# Patient Record
Sex: MALE | Race: WHITE | Employment: FULL TIME
[De-identification: names, ages, dates, MRNs, and addresses within clinical notes are randomized per-mention and may not be internally consistent; named-entity substitution may affect disease eponyms.]

---

## 2017-02-22 RX ORDER — SERTRALINE HYDROCHLORIDE 100 MG/1
100 TABLET, FILM COATED ORAL DAILY
Qty: 30 TABLET | Refills: 0 | Status: SHIPPED | OUTPATIENT
Start: 2017-02-22 | End: 2017-03-02 | Stop reason: SDUPTHER

## 2017-03-02 ENCOUNTER — OFFICE VISIT (OUTPATIENT)
Dept: FAMILY MEDICINE CLINIC | Facility: CLINIC | Age: 42
End: 2017-03-02

## 2017-03-02 VITALS — BODY MASS INDEX: 32.59 KG/M2 | SYSTOLIC BLOOD PRESSURE: 120 MMHG | DIASTOLIC BLOOD PRESSURE: 80 MMHG | WEIGHT: 247 LBS

## 2017-03-02 DIAGNOSIS — G47.09 OTHER INSOMNIA: ICD-10-CM

## 2017-03-02 DIAGNOSIS — F41.9 ANXIETY: Primary | ICD-10-CM

## 2017-03-02 PROCEDURE — 99213 OFFICE O/P EST LOW 20 MIN: CPT | Performed by: FAMILY MEDICINE

## 2017-03-02 RX ORDER — ZOLPIDEM TARTRATE 5 MG/1
5 TABLET ORAL NIGHTLY PRN
Qty: 30 TABLET | Refills: 5 | Status: SHIPPED | OUTPATIENT
Start: 2017-03-02 | End: 2017-04-01

## 2017-03-02 RX ORDER — SERTRALINE HYDROCHLORIDE 100 MG/1
100 TABLET, FILM COATED ORAL DAILY
Qty: 30 TABLET | Refills: 5 | Status: SHIPPED | OUTPATIENT
Start: 2017-03-02 | End: 2017-08-31 | Stop reason: SDUPTHER

## 2017-03-02 ASSESSMENT — PATIENT HEALTH QUESTIONNAIRE - PHQ9
1. LITTLE INTEREST OR PLEASURE IN DOING THINGS: 0
SUM OF ALL RESPONSES TO PHQ QUESTIONS 1-9: 0
SUM OF ALL RESPONSES TO PHQ9 QUESTIONS 1 & 2: 0
2. FEELING DOWN, DEPRESSED OR HOPELESS: 0

## 2017-03-03 ASSESSMENT — ENCOUNTER SYMPTOMS
TROUBLE SWALLOWING: 0
NAUSEA: 0
EYE REDNESS: 0
DIARRHEA: 0
ABDOMINAL PAIN: 0
BLOOD IN STOOL: 0
VOMITING: 0
COUGH: 0
CONSTIPATION: 0
SHORTNESS OF BREATH: 0
EYE DISCHARGE: 0
FACIAL SWELLING: 0

## 2017-06-05 ENCOUNTER — OFFICE VISIT (OUTPATIENT)
Dept: FAMILY MEDICINE CLINIC | Age: 42
End: 2017-06-05

## 2017-06-05 VITALS
DIASTOLIC BLOOD PRESSURE: 70 MMHG | WEIGHT: 247 LBS | BODY MASS INDEX: 32.74 KG/M2 | RESPIRATION RATE: 18 BRPM | HEIGHT: 73 IN | HEART RATE: 68 BPM | SYSTOLIC BLOOD PRESSURE: 120 MMHG

## 2017-06-05 DIAGNOSIS — K21.9 GASTROESOPHAGEAL REFLUX DISEASE WITHOUT ESOPHAGITIS: ICD-10-CM

## 2017-06-05 DIAGNOSIS — R07.89 NON-CARDIAC CHEST PAIN: Primary | ICD-10-CM

## 2017-06-05 PROCEDURE — 99213 OFFICE O/P EST LOW 20 MIN: CPT | Performed by: NURSE PRACTITIONER

## 2017-06-05 RX ORDER — PANTOPRAZOLE SODIUM 40 MG/1
TABLET, DELAYED RELEASE ORAL
Refills: 0 | COMMUNITY
Start: 2017-05-30 | End: 2017-08-31 | Stop reason: ALTCHOICE

## 2017-06-05 ASSESSMENT — ENCOUNTER SYMPTOMS
DIARRHEA: 0
SHORTNESS OF BREATH: 0
HEARTBURN: 1
NAUSEA: 0
VOMITING: 0
BLOOD IN STOOL: 0

## 2017-08-31 ENCOUNTER — OFFICE VISIT (OUTPATIENT)
Dept: FAMILY MEDICINE CLINIC | Age: 42
End: 2017-08-31

## 2017-08-31 VITALS — WEIGHT: 242 LBS | BODY MASS INDEX: 31.93 KG/M2 | DIASTOLIC BLOOD PRESSURE: 80 MMHG | SYSTOLIC BLOOD PRESSURE: 114 MMHG

## 2017-08-31 DIAGNOSIS — F41.9 ANXIETY: Primary | ICD-10-CM

## 2017-08-31 PROCEDURE — 99213 OFFICE O/P EST LOW 20 MIN: CPT | Performed by: FAMILY MEDICINE

## 2017-08-31 RX ORDER — NICOTINE POLACRILEX 4 MG/1
20 GUM, CHEWING ORAL DAILY
COMMUNITY
End: 2018-12-05 | Stop reason: ALTCHOICE

## 2017-08-31 RX ORDER — SERTRALINE HYDROCHLORIDE 100 MG/1
100 TABLET, FILM COATED ORAL DAILY
Qty: 30 TABLET | Refills: 5 | Status: SHIPPED | OUTPATIENT
Start: 2017-08-31 | End: 2018-02-28 | Stop reason: SDUPTHER

## 2017-08-31 ASSESSMENT — ENCOUNTER SYMPTOMS
COUGH: 0
EYE REDNESS: 0
DIARRHEA: 0
SHORTNESS OF BREATH: 0
FACIAL SWELLING: 0
EYE DISCHARGE: 0
NAUSEA: 0
VOMITING: 0

## 2017-10-10 ENCOUNTER — OFFICE VISIT (OUTPATIENT)
Dept: FAMILY MEDICINE CLINIC | Age: 42
End: 2017-10-10

## 2017-10-10 VITALS
BODY MASS INDEX: 32.87 KG/M2 | OXYGEN SATURATION: 98 % | SYSTOLIC BLOOD PRESSURE: 118 MMHG | TEMPERATURE: 97.9 F | DIASTOLIC BLOOD PRESSURE: 80 MMHG | HEIGHT: 73 IN | HEART RATE: 72 BPM | WEIGHT: 248 LBS

## 2017-10-10 DIAGNOSIS — Z00.00 NORMAL PHYSICAL EXAM: Primary | ICD-10-CM

## 2017-10-10 PROCEDURE — 99396 PREV VISIT EST AGE 40-64: CPT | Performed by: NURSE PRACTITIONER

## 2017-10-10 ASSESSMENT — ENCOUNTER SYMPTOMS
CONSTIPATION: 0
BLOOD IN STOOL: 0
VOMITING: 0
SHORTNESS OF BREATH: 0
HEARTBURN: 0
ORTHOPNEA: 0
NAUSEA: 0
DOUBLE VISION: 0
COUGH: 0
DIARRHEA: 0
BLURRED VISION: 0
SORE THROAT: 0
BACK PAIN: 0
ABDOMINAL PAIN: 0

## 2017-10-10 NOTE — PATIENT INSTRUCTIONS
SURVEY:    You may be receiving a survey from [employer] regarding your visit today. Please complete the survey to enable us to provide the highest quality of care to you and your family. If you cannot score us a very good on any question, please call the office to discuss how we could of made your experience a very good one. Thank you.

## 2018-02-28 ENCOUNTER — OFFICE VISIT (OUTPATIENT)
Dept: FAMILY MEDICINE CLINIC | Age: 43
End: 2018-02-28

## 2018-02-28 VITALS
OXYGEN SATURATION: 96 % | HEART RATE: 84 BPM | BODY MASS INDEX: 32.72 KG/M2 | DIASTOLIC BLOOD PRESSURE: 74 MMHG | WEIGHT: 248 LBS | SYSTOLIC BLOOD PRESSURE: 110 MMHG

## 2018-02-28 DIAGNOSIS — F41.9 ANXIETY: Primary | ICD-10-CM

## 2018-02-28 PROCEDURE — 99213 OFFICE O/P EST LOW 20 MIN: CPT | Performed by: FAMILY MEDICINE

## 2018-02-28 RX ORDER — SERTRALINE HYDROCHLORIDE 100 MG/1
100 TABLET, FILM COATED ORAL DAILY
Qty: 30 TABLET | Refills: 5 | Status: SHIPPED | OUTPATIENT
Start: 2018-02-28 | End: 2018-08-28 | Stop reason: SDUPTHER

## 2018-02-28 ASSESSMENT — ENCOUNTER SYMPTOMS
SHORTNESS OF BREATH: 0
EYE DISCHARGE: 0
COUGH: 0
VOMITING: 0
EYE REDNESS: 0
NAUSEA: 0
FACIAL SWELLING: 0
DIARRHEA: 0

## 2018-02-28 NOTE — PROGRESS NOTES
HPI Notes    Name: Deni Razo  : 1975         Chief Complaint:     Chief Complaint   Patient presents with    Anxiety       History of Present Illness:      Deni Razo is a 40 y.o.  male who presents with Anxiety      HPI  Anxiety - Pt is doing ok. Pt is a little sad as the foster child they had for 18mos had to go back to her real dad. They do get to see her every other weekend. Pt is going to stay on the zoloft. 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 The [employer] group is helping him. Pt is back to exercising at the [employer] [facility]. Pt denies any suicide or homicide thoughts. 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 They still have an 25 yr old foster child. Past Medical History:     Past Medical History:   Diagnosis Date    Dysthymic disorder     Insomnia       Reviewed all health maintenance requirements and ordered appropriate tests  Health Maintenance Due   Topic Date Due    HIV screen  1991    Diabetes screen  2016    Lipid screen  2018       Past Surgical History:     Past Surgical History:   Procedure Laterality Date    KIDNEY REMOVAL  2017    left kidney donated to a friend        Medications:       Prior to Admission medications    Medication Sig Start Date End Date Taking? Authorizing Provider   sertraline (ZOLOFT) 100 MG tablet Take 1 tablet by mouth daily 17  Yes Marium Paiz MD   omeprazole 20 MG EC tablet Take 20 mg by mouth daily    Historical Provider, MD        Allergies:       Patient has no known allergies. Social History:     Tobacco:    reports that he has never smoked. He has never used smokeless tobacco.  Alcohol:      reports that he does not drink alcohol. Drug Use:  reports that he does not use drugs.     Family History:     Family History   Problem Relation Age of Onset    Cancer Father      lung       Review of Systems:

## 2018-02-28 NOTE — PATIENT INSTRUCTIONS
SURVEY:    You may be receiving a survey from [employer] regarding your visit today. Please complete the survey to enable us to provide the highest quality of care to you and your family. If you cannot score us a very good on any question, please call the office to discuss how we could of made your experience a very good one. Thank you.

## 2018-08-28 ENCOUNTER — OFFICE VISIT (OUTPATIENT)
Dept: FAMILY MEDICINE CLINIC | Age: 43
End: 2018-08-28

## 2018-08-28 VITALS
DIASTOLIC BLOOD PRESSURE: 74 MMHG | BODY MASS INDEX: 33.64 KG/M2 | SYSTOLIC BLOOD PRESSURE: 110 MMHG | WEIGHT: 255 LBS | OXYGEN SATURATION: 98 % | HEART RATE: 92 BPM

## 2018-08-28 DIAGNOSIS — K21.9 GASTROESOPHAGEAL REFLUX DISEASE WITHOUT ESOPHAGITIS: Primary | ICD-10-CM

## 2018-08-28 DIAGNOSIS — F41.9 ANXIETY: ICD-10-CM

## 2018-08-28 PROCEDURE — 99213 OFFICE O/P EST LOW 20 MIN: CPT | Performed by: FAMILY MEDICINE

## 2018-08-28 RX ORDER — SERTRALINE HYDROCHLORIDE 100 MG/1
100 TABLET, FILM COATED ORAL DAILY
Qty: 30 TABLET | Refills: 5 | Status: SHIPPED | OUTPATIENT
Start: 2018-08-28 | End: 2019-07-08 | Stop reason: SDUPTHER

## 2018-08-28 ASSESSMENT — ENCOUNTER SYMPTOMS
EYE DISCHARGE: 0
VOMITING: 0
EYE REDNESS: 0
COUGH: 0
SHORTNESS OF BREATH: 0
DIARRHEA: 0
ABDOMINAL PAIN: 0

## 2018-08-28 ASSESSMENT — PATIENT HEALTH QUESTIONNAIRE - PHQ9
SUM OF ALL RESPONSES TO PHQ QUESTIONS 1-9: 0
2. FEELING DOWN, DEPRESSED OR HOPELESS: 0
SUM OF ALL RESPONSES TO PHQ9 QUESTIONS 1 & 2: 0
SUM OF ALL RESPONSES TO PHQ QUESTIONS 1-9: 0
1. LITTLE INTEREST OR PLEASURE IN DOING THINGS: 0

## 2018-08-28 NOTE — PROGRESS NOTES
Jonathon Mosher MD   omeprazole 20 MG EC tablet Take 20 mg by mouth daily   Yes Historical Provider, MD        Allergies:       Patient has no known allergies. Social History:     Tobacco:    reports that he has never smoked. He has never used smokeless tobacco.  Alcohol:      reports that he does not drink alcohol. Drug Use:  reports that he does not use drugs. Family History:     Family History   Problem Relation Age of Onset    Cancer Father         lung       Review of Systems:       Review of Systems   Constitutional: Negative for chills, fever and weight loss. Eyes: Negative for discharge and redness. Respiratory: Negative for cough and shortness of breath. Cardiovascular: Negative for chest pain and leg swelling. Gastrointestinal: Negative for abdominal pain, diarrhea, melena and vomiting. Neurological: Negative for facial asymmetry. Psychiatric/Behavioral: The patient is nervous/anxious. Physical Exam:     Physical Exam   Constitutional: He is oriented to person, place, and time. He appears well-developed and well-nourished. HENT:   Head: Normocephalic and atraumatic. Eyes: Pupils are equal, round, and reactive to light. Conjunctivae are normal. Right eye exhibits no discharge. Left eye exhibits no discharge. Neck: Neck supple. No thyromegaly present. Cardiovascular: Normal rate, regular rhythm and normal heart sounds. No murmur heard. Pulmonary/Chest: Effort normal and breath sounds normal. No respiratory distress. He has no wheezes. Abdominal: Soft. Bowel sounds are normal. There is no tenderness. Lymphadenopathy:     He has no cervical adenopathy. Neurological: He is alert and oriented to person, place, and time. Skin: Skin is warm and dry. No rash noted. Psychiatric: His speech is normal and behavior is normal. Thought content normal. His mood appears anxious. Vitals reviewed.       Vitals:  /74   Pulse 92   Wt 255 lb (115.7 kg)   SpO2 98% BMI 33.64 kg/m²       Data:     No results found for: NA, K, CL, CO2, BUN, CREATININE, GLUCOSE, PROT, LABALBU, BILITOT, ALKPHOS, AST, ALT  No results found for: WBC, RBC, HGB, HCT, MCV, MCH, MCHC, RDW, PLT, MPV  No results found for: TSH  Lab Results   Component Value Date    CHOL 195 01/29/2013    HDL 38 01/29/2013          Assessment/Plan:       1. Gastroesophageal reflux disease without esophagitis  Stable on the prilosec    2. Anxiety  Stable on the zoloft        Return in about 6 months (around 2/28/2019) for Anxiety.       Electronically signed by Yue Pro MD on 8/28/2018 at 1:51 PM

## 2018-12-05 ENCOUNTER — OFFICE VISIT (OUTPATIENT)
Dept: FAMILY MEDICINE CLINIC | Age: 43
End: 2018-12-05

## 2018-12-05 VITALS
BODY MASS INDEX: 32.87 KG/M2 | HEIGHT: 73 IN | WEIGHT: 248 LBS | OXYGEN SATURATION: 98 % | SYSTOLIC BLOOD PRESSURE: 122 MMHG | DIASTOLIC BLOOD PRESSURE: 80 MMHG | HEART RATE: 68 BPM

## 2018-12-05 DIAGNOSIS — F41.0 PANIC ATTACKS: ICD-10-CM

## 2018-12-05 DIAGNOSIS — F43.0 STRESS REACTION: ICD-10-CM

## 2018-12-05 DIAGNOSIS — F41.9 ANXIETY: Primary | ICD-10-CM

## 2018-12-05 PROBLEM — K21.9 GASTROESOPHAGEAL REFLUX DISEASE WITHOUT ESOPHAGITIS: Status: ACTIVE | Noted: 2017-08-02

## 2018-12-05 PROBLEM — Z52.4 KIDNEY DONOR: Status: ACTIVE | Noted: 2017-07-28

## 2018-12-05 PROCEDURE — 99212 OFFICE O/P EST SF 10 MIN: CPT | Performed by: NURSE PRACTITIONER

## 2018-12-05 RX ORDER — ALPRAZOLAM 0.5 MG/1
0.5 TABLET ORAL 3 TIMES DAILY PRN
Qty: 90 TABLET | Refills: 0 | Status: SHIPPED | OUTPATIENT
Start: 2018-12-05 | End: 2021-01-29 | Stop reason: SDUPTHER

## 2018-12-05 NOTE — PROGRESS NOTES
not use drugs. Family History:     Family History   Problem Relation Age of Onset    Cancer Father         lung       Review of Systems:         Review of Systems   Constitutional: Negative for appetite change, chills and fever. Respiratory: Negative for cough and shortness of breath. Cardiovascular: Negative for chest pain and palpitations. Gastrointestinal: Negative for diarrhea, nausea and vomiting. Psychiatric/Behavioral: Positive for dysphoric mood. The patient has insomnia (5 nights a week, falls asleep but wakes up. gets 5 hours a night). Physical Exam:     Vitals:  /80   Pulse 68   Ht 6' 1\" (1.854 m)   Wt 248 lb (112.5 kg)   SpO2 98%   BMI 32.72 kg/m²       Physical Exam   Constitutional: Vital signs are normal. He appears well-developed and well-nourished. He is cooperative. Cardiovascular: Normal rate, regular rhythm, S1 normal and S2 normal.    Pulmonary/Chest: Effort normal and breath sounds normal. No respiratory distress. Skin: Skin is warm, dry and intact. Psychiatric: His speech is normal and behavior is normal. Judgment normal. His mood appears anxious. Cognition and memory are normal. He exhibits a depressed mood. He expresses no homicidal and no suicidal ideation. He expresses no suicidal plans and no homicidal plans. Well groomed, makes eye contact. Somewhat fidgety during exam. Pt tearful when talking about wife's accident. Nursing note and vitals reviewed. Data:     No results found for: NA, K, CL, CO2, BUN, CREATININE, GLUCOSE, PROT, LABALBU, BILITOT, ALKPHOS, AST, ALT  No results found for: WBC, RBC, HGB, HCT, MCV, MCH, MCHC, RDW, PLT, MPV  No results found for: TSH  Lab Results   Component Value Date    CHOL 195 01/29/2013    HDL 38 01/29/2013          Assessment & Plan        Diagnosis Orders   1. Anxiety  Referral To Counseling Services   2. Panic attacks  Referral To Counseling Services   3.  Stress reaction  Referral To Counseling

## 2018-12-05 NOTE — PATIENT INSTRUCTIONS
SURVEY:    You may be receiving a survey from [employer] regarding your visit today. Please complete the survey to enable us to provide the highest quality of care to you and your family. If you cannot score us a very good on any question, please call the office to discuss how we could of made your experience a very good one. Thank you.

## 2018-12-07 ASSESSMENT — ENCOUNTER SYMPTOMS
COUGH: 0
DIARRHEA: 0
NAUSEA: 0
SHORTNESS OF BREATH: 0
VOMITING: 0

## 2019-01-03 ENCOUNTER — OFFICE VISIT (OUTPATIENT)
Dept: FAMILY MEDICINE CLINIC | Age: 44
End: 2019-01-03

## 2019-01-03 VITALS
TEMPERATURE: 97.8 F | SYSTOLIC BLOOD PRESSURE: 108 MMHG | DIASTOLIC BLOOD PRESSURE: 70 MMHG | BODY MASS INDEX: 32.85 KG/M2 | OXYGEN SATURATION: 98 % | HEART RATE: 74 BPM | WEIGHT: 249 LBS

## 2019-01-03 DIAGNOSIS — F33.1 MODERATE EPISODE OF RECURRENT MAJOR DEPRESSIVE DISORDER (HCC): Primary | ICD-10-CM

## 2019-01-03 DIAGNOSIS — F41.9 ANXIETY: ICD-10-CM

## 2019-01-03 PROCEDURE — 99213 OFFICE O/P EST LOW 20 MIN: CPT | Performed by: NURSE PRACTITIONER

## 2019-01-03 ASSESSMENT — ENCOUNTER SYMPTOMS
COUGH: 0
DIARRHEA: 0
SHORTNESS OF BREATH: 0
VOMITING: 0
NAUSEA: 0

## 2019-01-22 ENCOUNTER — TELEPHONE (OUTPATIENT)
Dept: FAMILY MEDICINE CLINIC | Age: 44
End: 2019-01-22

## 2019-01-22 DIAGNOSIS — F41.9 ANXIETY: Primary | ICD-10-CM

## 2019-01-23 RX ORDER — BUSPIRONE HYDROCHLORIDE 7.5 MG/1
7.5 TABLET ORAL 3 TIMES DAILY PRN
Qty: 90 TABLET | Refills: 2 | Status: SHIPPED | OUTPATIENT
Start: 2019-01-23 | End: 2019-02-22

## 2019-02-28 ENCOUNTER — OFFICE VISIT (OUTPATIENT)
Dept: FAMILY MEDICINE CLINIC | Age: 44
End: 2019-02-28

## 2019-02-28 VITALS
HEART RATE: 63 BPM | SYSTOLIC BLOOD PRESSURE: 120 MMHG | OXYGEN SATURATION: 98 % | DIASTOLIC BLOOD PRESSURE: 80 MMHG | BODY MASS INDEX: 30.61 KG/M2 | WEIGHT: 232 LBS

## 2019-02-28 DIAGNOSIS — F41.9 ANXIETY: ICD-10-CM

## 2019-02-28 PROCEDURE — 99213 OFFICE O/P EST LOW 20 MIN: CPT | Performed by: FAMILY MEDICINE

## 2019-02-28 RX ORDER — BUSPIRONE HYDROCHLORIDE 7.5 MG/1
7.5 TABLET ORAL 3 TIMES DAILY PRN
COMMUNITY
End: 2019-08-28

## 2019-02-28 ASSESSMENT — ENCOUNTER SYMPTOMS
NAUSEA: 0
CONSTIPATION: 0
COUGH: 0
FACIAL SWELLING: 0
EYE DISCHARGE: 0
ABDOMINAL PAIN: 0
SHORTNESS OF BREATH: 0
EYE REDNESS: 0
BLOOD IN STOOL: 0
DIARRHEA: 0
VOMITING: 0

## 2019-07-08 RX ORDER — SERTRALINE HYDROCHLORIDE 100 MG/1
TABLET, FILM COATED ORAL
Qty: 30 TABLET | Refills: 5 | Status: SHIPPED | OUTPATIENT
Start: 2019-07-08 | End: 2019-09-18

## 2019-08-28 ENCOUNTER — OFFICE VISIT (OUTPATIENT)
Dept: FAMILY MEDICINE CLINIC | Age: 44
End: 2019-08-28

## 2019-08-28 VITALS
HEART RATE: 98 BPM | SYSTOLIC BLOOD PRESSURE: 124 MMHG | DIASTOLIC BLOOD PRESSURE: 82 MMHG | OXYGEN SATURATION: 98 % | HEIGHT: 73 IN | WEIGHT: 239 LBS | BODY MASS INDEX: 31.68 KG/M2

## 2019-08-28 DIAGNOSIS — F41.9 ANXIETY: Primary | ICD-10-CM

## 2019-08-28 PROCEDURE — 99213 OFFICE O/P EST LOW 20 MIN: CPT | Performed by: FAMILY MEDICINE

## 2019-08-28 RX ORDER — BUPROPION HYDROCHLORIDE 150 MG/1
150 TABLET ORAL EVERY MORNING
Qty: 30 TABLET | Refills: 1 | Status: SHIPPED | OUTPATIENT
Start: 2019-08-28 | End: 2019-09-25 | Stop reason: SDUPTHER

## 2019-08-28 ASSESSMENT — ENCOUNTER SYMPTOMS
SHORTNESS OF BREATH: 0
EYE DISCHARGE: 0
EYE REDNESS: 0
COUGH: 0

## 2019-08-28 NOTE — PROGRESS NOTES
HPI Notes    Name: Ledy Sawyer  : 1975        Chief Complaint:     Chief Complaint   Patient presents with    Anxiety     pt states that he has been  having more anxiety for a while now, would like to see about upping his medication. was seeking  in [place], he had him up his current medication by 50mg and that has not been helping       History of Present Illness:     Ledy Sawyer is a 44 y.o.  male who presents with Anxiety (pt states that he has been  having more anxiety for a while now, would like to see about upping his medication. was seeking  in [place], he had him up his current medication by 50mg and that has not been helping)      HPI  Anxiety - Pt states his anxiety is not better in past 6mos. Pt states he tried the buspar but did not help. Pt finally went to see a new psychiatrist, Dr Priscila Ivy in [place]. Pt doesn't sleep well. 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 Pt is up to zoloft 150mg and not helping. Pt wondering about trying another medication. Past Medical History:     Past Medical History:   Diagnosis Date    Dysthymic disorder     Insomnia       Reviewed all health maintenance requirements and ordered appropriate tests  Health Maintenance Due   Topic Date Due    HIV screen  1991    Diabetes screen  2016    Lipid screen  2018       Past Surgical History:     Past Surgical History:   Procedure Laterality Date    KIDNEY REMOVAL  2017    left kidney donated to a friend        Medications:       Prior to Admission medications    Medication Sig Start Date End Date Taking?  Authorizing Provider   buPROPion (WELLBUTRIN XL) 150 MG extended release tablet Take 1 tablet by mouth every morning 19  Yes Veronica Candelaria MD   sertraline (ZOLOFT) 100 MG tablet TAKE 1 TABLET BY MOUTH EVERY DAY 19  Yes Veronica Candelaria MD   sertraline (ZOLOFT) 50 MG tablet TAKE 1 TABLET BY MOUTH DAILY 19   Historical

## 2019-09-25 ENCOUNTER — OFFICE VISIT (OUTPATIENT)
Dept: FAMILY MEDICINE CLINIC | Age: 44
End: 2019-09-25
Payer: COMMERCIAL

## 2019-09-25 VITALS
HEART RATE: 78 BPM | OXYGEN SATURATION: 98 % | SYSTOLIC BLOOD PRESSURE: 120 MMHG | DIASTOLIC BLOOD PRESSURE: 82 MMHG | BODY MASS INDEX: 31.94 KG/M2 | WEIGHT: 241 LBS | HEIGHT: 73 IN

## 2019-09-25 DIAGNOSIS — R21 RASH AND NONSPECIFIC SKIN ERUPTION: ICD-10-CM

## 2019-09-25 DIAGNOSIS — F41.9 ANXIETY: Primary | ICD-10-CM

## 2019-09-25 PROCEDURE — 99213 OFFICE O/P EST LOW 20 MIN: CPT | Performed by: FAMILY MEDICINE

## 2019-09-25 PROCEDURE — 1036F TOBACCO NON-USER: CPT | Performed by: FAMILY MEDICINE

## 2019-09-25 PROCEDURE — G8427 DOCREV CUR MEDS BY ELIG CLIN: HCPCS | Performed by: FAMILY MEDICINE

## 2019-09-25 PROCEDURE — G8417 CALC BMI ABV UP PARAM F/U: HCPCS | Performed by: FAMILY MEDICINE

## 2019-09-25 RX ORDER — BUPROPION HYDROCHLORIDE 150 MG/1
150 TABLET ORAL EVERY MORNING
Qty: 30 TABLET | Refills: 5 | Status: SHIPPED | OUTPATIENT
Start: 2019-09-25 | End: 2019-10-10 | Stop reason: SINTOL

## 2019-09-25 ASSESSMENT — ENCOUNTER SYMPTOMS
NAUSEA: 1
VOMITING: 0
EYE REDNESS: 0
EYE DISCHARGE: 0

## 2019-10-09 ENCOUNTER — TELEPHONE (OUTPATIENT)
Dept: FAMILY MEDICINE CLINIC | Age: 44
End: 2019-10-09

## 2019-11-08 ENCOUNTER — OFFICE VISIT (OUTPATIENT)
Dept: FAMILY MEDICINE CLINIC | Age: 44
End: 2019-11-08
Payer: COMMERCIAL

## 2019-11-08 VITALS
WEIGHT: 239 LBS | BODY MASS INDEX: 31.68 KG/M2 | HEIGHT: 73 IN | HEART RATE: 82 BPM | OXYGEN SATURATION: 98 % | SYSTOLIC BLOOD PRESSURE: 130 MMHG | DIASTOLIC BLOOD PRESSURE: 78 MMHG

## 2019-11-08 DIAGNOSIS — F41.0 PANIC ATTACKS: ICD-10-CM

## 2019-11-08 DIAGNOSIS — G47.00 INSOMNIA, UNSPECIFIED TYPE: ICD-10-CM

## 2019-11-08 DIAGNOSIS — F41.9 ANXIETY: Primary | ICD-10-CM

## 2019-11-08 PROCEDURE — G8417 CALC BMI ABV UP PARAM F/U: HCPCS | Performed by: INTERNAL MEDICINE

## 2019-11-08 PROCEDURE — G8427 DOCREV CUR MEDS BY ELIG CLIN: HCPCS | Performed by: INTERNAL MEDICINE

## 2019-11-08 PROCEDURE — 1036F TOBACCO NON-USER: CPT | Performed by: INTERNAL MEDICINE

## 2019-11-08 PROCEDURE — G8484 FLU IMMUNIZE NO ADMIN: HCPCS | Performed by: INTERNAL MEDICINE

## 2019-11-08 PROCEDURE — 99213 OFFICE O/P EST LOW 20 MIN: CPT | Performed by: INTERNAL MEDICINE

## 2019-11-08 RX ORDER — BUPROPION HYDROCHLORIDE 150 MG/1
TABLET ORAL
Refills: 5 | COMMUNITY
Start: 2019-10-30 | End: 2020-01-29

## 2019-11-08 RX ORDER — HYDROXYZINE HYDROCHLORIDE 25 MG/1
25 TABLET, FILM COATED ORAL 3 TIMES DAILY PRN
Qty: 90 TABLET | Refills: 0 | Status: SHIPPED | OUTPATIENT
Start: 2019-11-08 | End: 2019-12-10 | Stop reason: SDUPTHER

## 2019-11-08 RX ORDER — SERTRALINE HYDROCHLORIDE 100 MG/1
100 TABLET, FILM COATED ORAL DAILY
Qty: 30 TABLET | Refills: 5 | Status: SHIPPED | OUTPATIENT
Start: 2019-11-08 | End: 2020-01-29 | Stop reason: SDUPTHER

## 2019-11-08 RX ORDER — TRAZODONE HYDROCHLORIDE 100 MG/1
100 TABLET ORAL NIGHTLY PRN
Qty: 7 TABLET | Refills: 0 | Status: SHIPPED | OUTPATIENT
Start: 2019-11-08 | End: 2019-11-26

## 2019-11-08 ASSESSMENT — ENCOUNTER SYMPTOMS
SORE THROAT: 0
WHEEZING: 0
NAUSEA: 0
CONSTIPATION: 0
BLOOD IN STOOL: 0
ALLERGIC/IMMUNOLOGIC NEGATIVE: 1
COUGH: 0
ABDOMINAL PAIN: 0
CHEST TIGHTNESS: 0
SHORTNESS OF BREATH: 0

## 2019-11-26 ENCOUNTER — OFFICE VISIT (OUTPATIENT)
Dept: FAMILY MEDICINE CLINIC | Age: 44
End: 2019-11-26
Payer: COMMERCIAL

## 2019-11-26 VITALS
SYSTOLIC BLOOD PRESSURE: 110 MMHG | DIASTOLIC BLOOD PRESSURE: 80 MMHG | BODY MASS INDEX: 31.8 KG/M2 | WEIGHT: 241 LBS | OXYGEN SATURATION: 98 % | HEART RATE: 72 BPM

## 2019-11-26 DIAGNOSIS — G47.09 OTHER INSOMNIA: ICD-10-CM

## 2019-11-26 DIAGNOSIS — F41.9 ANXIETY: Primary | ICD-10-CM

## 2019-11-26 PROCEDURE — G8484 FLU IMMUNIZE NO ADMIN: HCPCS | Performed by: FAMILY MEDICINE

## 2019-11-26 PROCEDURE — 1036F TOBACCO NON-USER: CPT | Performed by: FAMILY MEDICINE

## 2019-11-26 PROCEDURE — G8417 CALC BMI ABV UP PARAM F/U: HCPCS | Performed by: FAMILY MEDICINE

## 2019-11-26 PROCEDURE — G8427 DOCREV CUR MEDS BY ELIG CLIN: HCPCS | Performed by: FAMILY MEDICINE

## 2019-11-26 PROCEDURE — 99213 OFFICE O/P EST LOW 20 MIN: CPT | Performed by: FAMILY MEDICINE

## 2019-11-27 ASSESSMENT — ENCOUNTER SYMPTOMS
EYE REDNESS: 0
EYE DISCHARGE: 0
VOMITING: 0
SHORTNESS OF BREATH: 0
DIARRHEA: 0

## 2020-01-29 ENCOUNTER — OFFICE VISIT (OUTPATIENT)
Dept: FAMILY MEDICINE CLINIC | Age: 45
End: 2020-01-29
Payer: COMMERCIAL

## 2020-01-29 VITALS
OXYGEN SATURATION: 98 % | HEART RATE: 70 BPM | DIASTOLIC BLOOD PRESSURE: 80 MMHG | BODY MASS INDEX: 32.46 KG/M2 | WEIGHT: 246 LBS | SYSTOLIC BLOOD PRESSURE: 120 MMHG

## 2020-01-29 PROCEDURE — G8427 DOCREV CUR MEDS BY ELIG CLIN: HCPCS | Performed by: FAMILY MEDICINE

## 2020-01-29 PROCEDURE — G8417 CALC BMI ABV UP PARAM F/U: HCPCS | Performed by: FAMILY MEDICINE

## 2020-01-29 PROCEDURE — 99213 OFFICE O/P EST LOW 20 MIN: CPT | Performed by: FAMILY MEDICINE

## 2020-01-29 PROCEDURE — 1036F TOBACCO NON-USER: CPT | Performed by: FAMILY MEDICINE

## 2020-01-29 PROCEDURE — G8484 FLU IMMUNIZE NO ADMIN: HCPCS | Performed by: FAMILY MEDICINE

## 2020-01-29 RX ORDER — SERTRALINE HYDROCHLORIDE 100 MG/1
100 TABLET, FILM COATED ORAL DAILY
Qty: 30 TABLET | Refills: 5 | Status: SHIPPED | OUTPATIENT
Start: 2020-01-29 | End: 2021-01-27 | Stop reason: ALTCHOICE

## 2020-01-29 RX ORDER — HYDROXYZINE HYDROCHLORIDE 25 MG/1
25 TABLET, FILM COATED ORAL EVERY 8 HOURS PRN
Qty: 60 TABLET | Refills: 5 | Status: SHIPPED | OUTPATIENT
Start: 2020-01-29 | End: 2021-01-27 | Stop reason: SDUPTHER

## 2020-01-29 ASSESSMENT — ENCOUNTER SYMPTOMS
COUGH: 0
EYE DISCHARGE: 0
EYE REDNESS: 0
WHEEZING: 0
VOMITING: 0
DIARRHEA: 0

## 2020-01-29 NOTE — PATIENT INSTRUCTIONS
SURVEY:    You may be receiving a survey from [employer] regarding your visit today. Please complete the survey to enable us to provide the highest quality of care to you and your family. If you cannot score us a very good on any question, please call the office to discuss how we could have made your experience a very good one. Thank you.

## 2020-01-29 NOTE — PROGRESS NOTES
REMOVAL  07/2017    left kidney donated to a friend        Medications:       Prior to Admission medications    Medication Sig Start Date End Date Taking? Authorizing Provider   hydrOXYzine (ATARAX) 25 MG tablet Take 1 tablet by mouth every 8 hours as needed for Anxiety 1/29/20  Yes Vaibhav Quinones MD   sertraline (ZOLOFT) 100 MG tablet Take 1 tablet by mouth daily 1/29/20  Yes Vaibhav Quinones MD        Allergies:       Patient has no known allergies. Social History:     Tobacco:    reports that he has never smoked. He has never used smokeless tobacco.  Alcohol:      reports no history of alcohol use. Drug Use:  reports no history of drug use. Family History:     Family History   Problem Relation Age of Onset    Cancer Father         lung       Review of Systems:       Review of Systems   Constitutional: Negative for chills and fever. Eyes: Negative for discharge and redness. Respiratory: Negative for cough and wheezing. Cardiovascular: Negative for chest pain and palpitations. Gastrointestinal: Negative for diarrhea and vomiting. Skin: Negative for rash. Psychiatric/Behavioral: Negative for dysphoric mood and sleep disturbance. The patient is not nervous/anxious. Physical Exam:     Physical Exam  Vitals signs reviewed. Constitutional:       Appearance: He is well-developed. HENT:      Head: Normocephalic and atraumatic. Eyes:      General:         Right eye: No discharge. Left eye: No discharge. Conjunctiva/sclera: Conjunctivae normal.      Pupils: Pupils are equal, round, and reactive to light. Neck:      Musculoskeletal: Neck supple. No muscular tenderness. Thyroid: No thyromegaly. Cardiovascular:      Rate and Rhythm: Normal rate and regular rhythm. Heart sounds: No murmur. Pulmonary:      Effort: Pulmonary effort is normal. No respiratory distress. Breath sounds: Normal breath sounds.    Lymphadenopathy:      Cervical: No cervical

## 2021-01-27 ENCOUNTER — OFFICE VISIT (OUTPATIENT)
Dept: FAMILY MEDICINE CLINIC | Age: 46
End: 2021-01-27
Payer: COMMERCIAL

## 2021-01-27 VITALS
DIASTOLIC BLOOD PRESSURE: 82 MMHG | SYSTOLIC BLOOD PRESSURE: 110 MMHG | OXYGEN SATURATION: 99 % | BODY MASS INDEX: 32.2 KG/M2 | HEIGHT: 73 IN | WEIGHT: 243 LBS | HEART RATE: 66 BPM

## 2021-01-27 DIAGNOSIS — F33.1 MODERATE EPISODE OF RECURRENT MAJOR DEPRESSIVE DISORDER (HCC): ICD-10-CM

## 2021-01-27 PROCEDURE — 1036F TOBACCO NON-USER: CPT | Performed by: FAMILY MEDICINE

## 2021-01-27 PROCEDURE — G8417 CALC BMI ABV UP PARAM F/U: HCPCS | Performed by: FAMILY MEDICINE

## 2021-01-27 PROCEDURE — G8427 DOCREV CUR MEDS BY ELIG CLIN: HCPCS | Performed by: FAMILY MEDICINE

## 2021-01-27 PROCEDURE — G8484 FLU IMMUNIZE NO ADMIN: HCPCS | Performed by: FAMILY MEDICINE

## 2021-01-27 PROCEDURE — 99213 OFFICE O/P EST LOW 20 MIN: CPT | Performed by: FAMILY MEDICINE

## 2021-01-27 RX ORDER — FLUVOXAMINE MALEATE 150 MG/1
150 CAPSULE, EXTENDED RELEASE ORAL NIGHTLY
Qty: 30 CAPSULE | Refills: 5 | Status: SHIPPED | OUTPATIENT
Start: 2021-01-27 | End: 2021-01-29

## 2021-01-27 RX ORDER — FLUVOXAMINE MALEATE 150 MG/1
CAPSULE, EXTENDED RELEASE ORAL
COMMUNITY
Start: 2020-12-14 | End: 2021-01-27 | Stop reason: SDUPTHER

## 2021-01-27 RX ORDER — HYDROXYZINE HYDROCHLORIDE 25 MG/1
TABLET, FILM COATED ORAL
Qty: 60 TABLET | Refills: 5 | Status: SHIPPED | OUTPATIENT
Start: 2021-01-27 | End: 2021-07-07 | Stop reason: SDUPTHER

## 2021-01-27 ASSESSMENT — ENCOUNTER SYMPTOMS
SHORTNESS OF BREATH: 0
ABDOMINAL PAIN: 0
VOMITING: 0

## 2021-01-28 ENCOUNTER — TELEPHONE (OUTPATIENT)
Dept: FAMILY MEDICINE CLINIC | Age: 46
End: 2021-01-28

## 2021-01-28 DIAGNOSIS — F41.0 PANIC ATTACKS: ICD-10-CM

## 2021-01-28 DIAGNOSIS — F43.0 STRESS REACTION: ICD-10-CM

## 2021-01-28 DIAGNOSIS — F41.9 ANXIETY: ICD-10-CM

## 2021-01-28 NOTE — TELEPHONE ENCOUNTER
I did not receive a PA for the Luvox. So they may not cover it? Is he willing to go back on the zoloft 100mg one po daily?

## 2021-01-28 NOTE — TELEPHONE ENCOUNTER
Judy Crowley left a message regarding his depression medicine. He said the pharmacy rejected it. Can we look into seeing if it needs a prior auth or if one has been started for him. He said the pharmacy called the office yesterday morning. He did check and he said generic zoloft is covered.     DM-chris    Health Maintenance   Topic Date Due    Hepatitis C screen  1975    HIV screen  11/01/1991    Diabetes screen  11/01/2016    Lipid screen  01/29/2018    Flu vaccine (1) 09/01/2020    DTaP/Tdap/Td vaccine (2 - Td) 07/23/2024    Hepatitis A vaccine  Aged Out    Hepatitis B vaccine  Aged Out    Hib vaccine  Aged Out    Meningococcal (ACWY) vaccine  Aged Out    Pneumococcal 0-64 years Vaccine  Aged Out             (applicable per patient's age: Cancer Screenings, Depression Screening, Fall Risk Screening, Immunizations)    LDL Cholesterol (mg/dL)   Date Value   01/29/2013 142 (H)      (goal A1C is < 7)   (goal LDL is <100) need 30-50% reduction from baseline     BP Readings from Last 3 Encounters:   01/27/21 110/82   01/29/20 120/80   11/26/19 110/80    (goal /80)      All Future Testing planned in [facility]:      Next Visit Date:  Future Appointments   Date Time Provider Anna Fitzpatrick   7/22/2021  8:40 AM MD Cheryle Marcelo [facility]            Patient Active Problem List:     Depression     Anxiety     Panic attacks     Cough with hemoptysis     Hyperlipidemia     Gastroesophageal reflux disease without esophagitis     Kidney donor     Moderate episode of recurrent major depressive disorder ([facility] [address]

## 2021-01-28 NOTE — TELEPHONE ENCOUNTER
Medication was denied due to     Denied on January 27   Unable to approve fluvoxamine ER capsules 150mg due to unmet criteria (2) as outlined in the plan guideline below. Member must try drug alternatives for a specific timeframe per criteria number [2]: Duloxetine, Desvenlafaxine, Venlafaxine.  If you

## 2021-01-28 NOTE — TELEPHONE ENCOUNTER
If pt wants to stay on the medication then try he has to try effexor 37.5mg and if that doesn't help he may then qualify going back on the Luvox? ? Or just go back on the zoloft?

## 2021-01-29 RX ORDER — ALPRAZOLAM 0.5 MG/1
0.5 TABLET ORAL 3 TIMES DAILY PRN
Qty: 45 TABLET | Refills: 0 | Status: SHIPPED | OUTPATIENT
Start: 2021-01-29 | End: 2021-02-13

## 2021-01-29 RX ORDER — SERTRALINE HYDROCHLORIDE 100 MG/1
100 TABLET, FILM COATED ORAL DAILY
Qty: 30 TABLET | Refills: 5 | Status: SHIPPED | OUTPATIENT
Start: 2021-01-29 | End: 2021-10-04 | Stop reason: ALTCHOICE

## 2021-01-29 NOTE — TELEPHONE ENCOUNTER
Ok great thanks. Tell him l will NOW send over the Zoloft 100mg daily to start right away to [facility]. Then start taking the Luvox every other day for 2wks then about every 3d ONE week and stop as the zoloft should be in his system then.  Tell him to call if any problems

## 2021-07-07 ENCOUNTER — OFFICE VISIT (OUTPATIENT)
Dept: FAMILY MEDICINE CLINIC | Age: 46
End: 2021-07-07
Payer: COMMERCIAL

## 2021-07-07 VITALS
BODY MASS INDEX: 27.23 KG/M2 | HEIGHT: 73 IN | WEIGHT: 205.5 LBS | SYSTOLIC BLOOD PRESSURE: 110 MMHG | HEART RATE: 64 BPM | DIASTOLIC BLOOD PRESSURE: 74 MMHG

## 2021-07-07 DIAGNOSIS — S29.019A THORACIC MYOFASCIAL STRAIN, INITIAL ENCOUNTER: ICD-10-CM

## 2021-07-07 DIAGNOSIS — F41.9 ANXIETY: Primary | ICD-10-CM

## 2021-07-07 DIAGNOSIS — F41.0 PANIC ATTACKS: ICD-10-CM

## 2021-07-07 PROCEDURE — 1036F TOBACCO NON-USER: CPT | Performed by: FAMILY MEDICINE

## 2021-07-07 PROCEDURE — G8427 DOCREV CUR MEDS BY ELIG CLIN: HCPCS | Performed by: FAMILY MEDICINE

## 2021-07-07 PROCEDURE — 99213 OFFICE O/P EST LOW 20 MIN: CPT | Performed by: FAMILY MEDICINE

## 2021-07-07 PROCEDURE — G8417 CALC BMI ABV UP PARAM F/U: HCPCS | Performed by: FAMILY MEDICINE

## 2021-07-07 RX ORDER — GABAPENTIN 100 MG/1
100 CAPSULE ORAL 3 TIMES DAILY
COMMUNITY

## 2021-07-07 RX ORDER — DULOXETIN HYDROCHLORIDE 30 MG/1
30 CAPSULE, DELAYED RELEASE ORAL 2 TIMES DAILY
COMMUNITY
End: 2021-10-04 | Stop reason: DRUGHIGH

## 2021-07-07 RX ORDER — HYDROXYZINE HYDROCHLORIDE 25 MG/1
TABLET, FILM COATED ORAL
Qty: 60 TABLET | Refills: 5 | Status: SHIPPED | OUTPATIENT
Start: 2021-07-07

## 2021-07-07 RX ORDER — ALPRAZOLAM 0.5 MG/1
TABLET ORAL
COMMUNITY
Start: 2021-07-01

## 2021-07-07 SDOH — ECONOMIC STABILITY: FOOD INSECURITY: WITHIN THE PAST 12 MONTHS, YOU WORRIED THAT YOUR FOOD WOULD RUN OUT BEFORE YOU GOT MONEY TO BUY MORE.: NEVER TRUE

## 2021-07-07 SDOH — ECONOMIC STABILITY: FOOD INSECURITY: WITHIN THE PAST 12 MONTHS, THE FOOD YOU BOUGHT JUST DIDN'T LAST AND YOU DIDN'T HAVE MONEY TO GET MORE.: NEVER TRUE

## 2021-07-07 ASSESSMENT — ENCOUNTER SYMPTOMS
CONSTIPATION: 0
COUGH: 0
VOMITING: 0
DIARRHEA: 0
BACK PAIN: 1
EYE REDNESS: 0
NAUSEA: 0
BOWEL INCONTINENCE: 0
EYE DISCHARGE: 0
SHORTNESS OF BREATH: 0
TROUBLE SWALLOWING: 0
ABDOMINAL PAIN: 0

## 2021-07-07 ASSESSMENT — SOCIAL DETERMINANTS OF HEALTH (SDOH): HOW HARD IS IT FOR YOU TO PAY FOR THE VERY BASICS LIKE FOOD, HOUSING, MEDICAL CARE, AND HEATING?: NOT HARD AT ALL

## 2021-07-07 NOTE — PROGRESS NOTES
HPI Notes    Name: Angel Luis Resendez  : 1975        Chief Complaint:     Chief Complaint   Patient presents with    Depression     medication was adjusted has some days with depression but does notice a difference     Back Pain     Started a new job and has been having back pain       History of Present Illness:     Angel Luis Resendez is a 41 y.o.  male who presents with Depression (medication was adjusted has some days with depression but does notice a difference ) and Back Pain (Started a new job and has been having back pain)      Back Pain  This is a new problem. The current episode started more than 1 month ago. The problem occurs daily. 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 ) since onset. The pain is present in the thoracic spine. The quality of the pain is described as aching. The pain does not radiate. The symptoms are aggravated by bending and twisting. Pertinent negatives include no abdominal pain, bladder incontinence, bowel incontinence, chest pain, dysuria, fever, headaches, numbness, paresis, paresthesias, tingling or weakness. Treatments tried: pt is wears a back brace. Pt uses heat, stretches and muscle. Pt can't take the NSAIDS since donated a kidney. Anxiety and panic attacks  - pt is seeing his psychiatrist and weaning off the zoloft in the next few days. Pt has been started on the cymbalta 30mg BID and the nerontin 100mg TID from the psychiatrist. Pt has the xanax to take as needed and mainly taking at night to help him sleep. Pt also has some atarax as needed.  Pt jhas appt in few weeks with psychiatrist.    Past Medical History:     Past Medical History:   Diagnosis Date    Dysthymic disorder     Insomnia       Reviewed all health maintenance requirements and ordered appropriate tests  Health Maintenance Due   Topic Date Due    Hepatitis C screen  Never done    HIV screen  Never done    Diabetes screen  Never done    Lipid screen  01/29/2018       Past Surgical History:     Past Surgical History:   Procedure Laterality Date    KIDNEY REMOVAL  07/2017    left kidney donated to a friend        Medications:       Prior to Admission medications    Medication Sig Start Date End Date Taking? Authorizing Provider   gabapentin (NEURONTIN) 100 MG capsule Take 100 mg by mouth 3 times daily. Yes Historical Provider, MD   DULoxetine (CYMBALTA) 30 MG extended release capsule Take 30 mg by mouth 2 times daily Taking 1 tab daily for 7 days then will take BID  07/10/2021   Yes Historical Provider, MD   ALPRAZolam (XANAX) 0.5 MG tablet TAKE 1 TABLET BY MOUTH TWICE DAILY AS NEEDED 7/1/21  Yes Historical Provider, MD   hydrOXYzine (ATARAX) 25 MG tablet Take 2 tablets daily 7/7/21  Yes Jh Gonzalez MD   sertraline (ZOLOFT) 100 MG tablet Take 1 tablet by mouth daily  Patient taking differently: Take 100 mg by mouth daily Pt is tapering down currently 1/2 tab daily for 7 days  discontinue after 7 days 1/29/21   Jh Gonzalez MD        Allergies:       Patient has no known allergies. Social History:     Tobacco:    reports that he has never smoked. He has never used smokeless tobacco.  Alcohol:      reports no history of alcohol use. Drug Use:  reports no history of drug use. Family History:     Family History   Problem Relation Age of Onset    Cancer Father         lung       Review of Systems:       Review of Systems   Constitutional: Negative for chills, fatigue and fever. HENT: Negative for trouble swallowing. Eyes: Negative for discharge, redness and visual disturbance. Respiratory: Negative for cough and shortness of breath. Cardiovascular: Negative for chest pain and palpitations.    Gastrointestinal: Negative for abdominal pain, bowel incontinence, constipation, diarrhea, nausea and vomiting. Genitourinary: Negative for bladder incontinence, dysuria and hematuria. Musculoskeletal: Positive for back pain. Negative for joint swelling. Skin: Negative for rash. Neurological: Negative for dizziness, tingling, tremors, weakness, light-headedness, numbness, headaches and paresthesias. Psychiatric/Behavioral: Positive for sleep disturbance. The patient is nervous/anxious. Physical Exam:     Physical Exam  Vitals reviewed. Constitutional:       General: He is not in acute distress. Appearance: Normal appearance. He is well-developed. He is not ill-appearing. HENT:      Head: Normocephalic and atraumatic. Eyes:      General:         Right eye: No discharge. Left eye: No discharge. Conjunctiva/sclera: Conjunctivae normal.      Pupils: Pupils are equal, round, and reactive to light. Neck:      Thyroid: No thyromegaly. Cardiovascular:      Rate and Rhythm: Normal rate and regular rhythm. Heart sounds: No murmur heard. Pulmonary:      Effort: Pulmonary effort is normal.      Breath sounds: Normal breath sounds. Abdominal:      General: Bowel sounds are normal. There is no distension. Palpations: Abdomen is soft. Tenderness: There is no abdominal tenderness. Musculoskeletal:      Cervical back: Neck supple. Thoracic back: Tenderness present. No swelling, edema, deformity, signs of trauma, spasms or bony tenderness. Normal range of motion. No scoliosis. Skin:     Findings: No erythema or rash. Neurological:      Mental Status: He is alert and oriented to person, place, and time. Psychiatric:         Mood and Affect: Mood normal.         Behavior: Behavior normal.         Thought Content:  Thought content normal.         Vitals:  /74 (Site: Left Upper Arm, Position: Sitting, Cuff Size: Medium Adult)   Pulse 64   Ht 6' 1\" (1.854 m)   Wt 205 lb 8 oz (93.2 kg)   BMI 27.11 kg/m²       Data:     No results found for: NA, K, CL, CO2, BUN, CREATININE, GLUCOSE, PROT, LABALBU, BILITOT, ALKPHOS, AST, ALT  No results found for: WBC, RBC, HGB, HCT, MCV, MCH, MCHC, RDW, PLT, MPV  No results found for: TSH  Lab Results   Component Value Date    CHOL 195 01/29/2013    HDL 38 01/29/2013          Assessment/Plan:        1. Thoracic myofascial strain, initial encounter  Continue heat and stretches and may try some yoga. Continue wearing back brace. 2. Anxiety  Improving on the cymbalta and neurontin per psychiatrist and has santiago appt in 6mos    3. Panic attacks  See above #2          Return in about 6 months (around 1/7/2022) for Anxiety.       Electronically signed by Cherrie Duvall MD on 7/7/2021 at 2:27 PM

## 2021-08-11 ENCOUNTER — HOSPITAL ENCOUNTER (OUTPATIENT)
Age: 46
Discharge: HOME | End: 2021-08-11
Payer: COMMERCIAL

## 2021-08-11 LAB
ALBUMIN SERPL-MCNC: 4.5 G/DL (ref 3.5–5.2)
ALBUMIN/GLOBULIN RATIO: ABNORMAL (ref 1–2.5)
ALP BLD-CCNC: 80 U/L (ref 40–129)
ALT SERPL-CCNC: 13 U/L (ref 5–41)
ANION GAP SERPL CALCULATED.3IONS-SCNC: 9 MMOL/L (ref 9–17)
AST SERPL-CCNC: 15 U/L
BILIRUB SERPL-MCNC: 0.18 MG/DL (ref 0.3–1.2)
BUN BLDV-MCNC: 17 MG/DL (ref 6–20)
BUN/CREAT BLD: 14 (ref 9–20)
CALCIUM SERPL-MCNC: 9.3 MG/DL (ref 8.6–10.4)
CHLORIDE BLD-SCNC: 102 MMOL/L (ref 98–107)
CO2: 29 MMOL/L (ref 20–31)
CREAT SERPL-MCNC: 1.23 MG/DL (ref 0.7–1.2)
GFR AFRICAN AMERICAN: >60 ML/MIN
GFR NON-AFRICAN AMERICAN: >60 ML/MIN
GFR SERPL CREATININE-BSD FRML MDRD: ABNORMAL ML/MIN/{1.73_M2}
GFR SERPL CREATININE-BSD FRML MDRD: ABNORMAL ML/MIN/{1.73_M2}
GLUCOSE BLD-MCNC: 130 MG/DL (ref 70–99)
POTASSIUM SERPL-SCNC: 4 MMOL/L (ref 3.7–5.3)
SODIUM BLD-SCNC: 140 MMOL/L (ref 135–144)
TOTAL PROTEIN: 7.1 G/DL (ref 6.4–8.3)

## 2021-08-11 PROCEDURE — 36415 COLL VENOUS BLD VENIPUNCTURE: CPT

## 2021-08-11 PROCEDURE — 80053 COMPREHEN METABOLIC PANEL: CPT

## 2021-10-04 ENCOUNTER — OFFICE VISIT (OUTPATIENT)
Dept: FAMILY MEDICINE CLINIC | Age: 46
End: 2021-10-04
Payer: COMMERCIAL

## 2021-10-04 VITALS
WEIGHT: 196 LBS | BODY MASS INDEX: 25.15 KG/M2 | HEART RATE: 74 BPM | DIASTOLIC BLOOD PRESSURE: 62 MMHG | SYSTOLIC BLOOD PRESSURE: 110 MMHG | TEMPERATURE: 98.3 F | OXYGEN SATURATION: 98 % | HEIGHT: 74 IN

## 2021-10-04 DIAGNOSIS — Z13.220 SCREENING FOR HYPERLIPIDEMIA: ICD-10-CM

## 2021-10-04 DIAGNOSIS — Z13.1 ENCOUNTER FOR SCREENING EXAMINATION FOR IMPAIRED GLUCOSE REGULATION AND DIABETES MELLITUS: ICD-10-CM

## 2021-10-04 DIAGNOSIS — Z02.89 ENCOUNTER FOR PHYSICAL EXAMINATION OF PROSPECTIVE FOSTER PARENT: Primary | ICD-10-CM

## 2021-10-04 PROCEDURE — G8484 FLU IMMUNIZE NO ADMIN: HCPCS | Performed by: NURSE PRACTITIONER

## 2021-10-04 PROCEDURE — 99396 PREV VISIT EST AGE 40-64: CPT | Performed by: NURSE PRACTITIONER

## 2021-10-04 RX ORDER — DULOXETIN HYDROCHLORIDE 60 MG/1
CAPSULE, DELAYED RELEASE ORAL
COMMUNITY
Start: 2021-09-21

## 2021-10-04 ASSESSMENT — ENCOUNTER SYMPTOMS
VOMITING: 0
SHORTNESS OF BREATH: 0
NAUSEA: 0
SORE THROAT: 0
CONSTIPATION: 0
BACK PAIN: 0
BLOOD IN STOOL: 0
COUGH: 0
ABDOMINAL PAIN: 0
DIARRHEA: 0

## 2021-10-04 NOTE — PATIENT INSTRUCTIONS
SURVEY:    You may be receiving a survey from [employer] regarding your visit today. Please complete the survey to enable us to provide the highest quality of care to you and your family. If you cannot score us a very good (5 Stars) on any question, please call the office to discuss how we could have made your experience a very good one. Thank you.     Clinical Care Team: DOUGIE Boone LPN    Clerical Team: Jaylin Villarreal

## 2021-10-04 NOTE — PROGRESS NOTES
[facility] Notes    Name: Alexis Merlin  : 1975         Chief Complaint:     Chief Complaint   Patient presents with    Annual Exam     Patient here today for foster care physical.        History of Present Illness:        HPI  Pt is a 38yo male who presents for a physical exam for being a . Pt has lost 55# in 9 months by eating less and moving more. Pt sees Dr Ishaan Arnold and a psychiatrist for anxiety and depression. Denies any concerns about his health. Pt states \"I'm in the best shape of my life right now\". Mentally he feels like he is in a good place. Past Medical History:     Past Medical History:   Diagnosis Date    Dysthymic disorder     Insomnia       Reviewed all health maintenance requirements and ordered appropriate tests  Health Maintenance Due   Topic Date Due    Hepatitis C screen  Never done    HIV screen  Never done    Diabetes screen  Never done    Lipid screen  2018    Flu vaccine (1) Never done       Past Surgical History:     Past Surgical History:   Procedure Laterality Date    KIDNEY REMOVAL  2017    left kidney donated to a friend        Medications:       Prior to Admission medications    Medication Sig Start Date End Date Taking? Authorizing Provider   DULoxetine (CYMBALTA) 60 MG extended release capsule Take 1 capsule by mouth every morning 21  Yes Historical Provider, MD   gabapentin (NEURONTIN) 100 MG capsule Take 100 mg by mouth 3 times daily. Yes Historical Provider, MD   ALPRAZolam Marcellina Bullion) 0.5 MG tablet TAKE 1 TABLET BY MOUTH TWICE DAILY AS NEEDED 21  Yes Historical Provider, MD   hydrOXYzine (ATARAX) 25 MG tablet Take 2 tablets daily 21  Yes Elsie Snellen, MD        Allergies:       Patient has no known allergies. Social History:     Tobacco:    reports that he has never smoked. He has never used smokeless tobacco.  Alcohol:      reports no history of alcohol use. Drug Use:  reports no history of drug use.     Family History: Family History   Problem Relation Age of Onset    Cancer Father         lung       Review of Systems:         Review of Systems   Constitutional: Negative for chills and fever. HENT: Negative for sore throat. Respiratory: Negative for cough and shortness of breath. Cardiovascular: Negative for chest pain, palpitations and leg swelling. Gastrointestinal: Negative for abdominal pain, blood in stool, constipation, diarrhea, nausea and vomiting. Genitourinary: Negative for dysuria, frequency and hematuria. Musculoskeletal: Negative for back pain and neck pain. Skin: Negative for rash. Neurological: Negative for dizziness, tremors, seizures, weakness and headaches. Hematological: Does not bruise/bleed easily. Psychiatric/Behavioral: Negative for self-injury, sleep disturbance and suicidal ideas. The patient is not nervous/anxious. Physical Exam:     Vitals:  /62   Pulse 74   Temp 98.3 °F (36.8 °C) (Oral)   Ht 6' 2.2\" (1.885 m)   Wt 196 lb (88.9 kg)   SpO2 98%   BMI 25.03 kg/m²       Physical Exam  Vitals and nursing note reviewed. Constitutional:       Appearance: He is well-developed. HENT:      Head: Normocephalic. Right Ear: Hearing, tympanic membrane and external ear normal.      Left Ear: Tympanic membrane and external ear normal.      Nose: Nose normal.      Mouth/Throat:      Pharynx: Uvula midline. No oropharyngeal exudate. Eyes:      Conjunctiva/sclera: Conjunctivae normal.      Pupils: Pupils are equal, round, and reactive to light. Neck:      Vascular: No carotid bruit. Cardiovascular:      Rate and Rhythm: Normal rate and regular rhythm. Pulses:           Dorsalis pedis pulses are 2+ on the right side and 2+ on the left side. Heart sounds: S1 normal and S2 normal.   Pulmonary:      Effort: Pulmonary effort is normal. No respiratory distress. Breath sounds: Normal breath sounds. Abdominal:      Palpations: Abdomen is soft. Tenderness: There is no abdominal tenderness. Musculoskeletal:         General: Normal range of motion. Cervical back: Normal range of motion and neck supple. Skin:     General: Skin is warm and dry. Findings: No rash. Neurological:      Mental Status: He is alert and oriented to person, place, and time. GCS: GCS eye subscore is 4. GCS verbal subscore is 5. GCS motor subscore is 6. Deep Tendon Reflexes: Reflexes are normal and symmetric. Psychiatric:         Speech: Speech normal.         Behavior: Behavior normal. Behavior is cooperative. Thought Content: Thought content normal.         Judgment: Judgment normal.               Data:     Lab Results   Component Value Date     08/11/2021    K 4.0 08/11/2021     08/11/2021    CO2 29 08/11/2021    BUN 17 08/11/2021    CREATININE 1.23 08/11/2021    GLUCOSE 130 08/11/2021    PROT 7.1 08/11/2021    LABALBU 4.5 08/11/2021    BILITOT 0.18 08/11/2021    ALKPHOS 80 08/11/2021    AST 15 08/11/2021    ALT 13 08/11/2021     No results found for: WBC, RBC, HGB, HCT, MCV, MCH, MCHC, RDW, PLT, MPV  No results found for: TSH  Lab Results   Component Value Date    CHOL 195 01/29/2013    HDL 38 01/29/2013          Assessment & Plan        Diagnosis Orders   1. Encounter for physical examination of prospective        Routine adult health physical.  Patient has no complaints at this time. No abnormalities or deficiencies. Patient educated about adequate water intake  Patient educated about exercise and fitness  Patient educated about nutrition    Health maintenance requirements reviewed with patient    If any further needs, return to office. Completed Refills   Requested Prescriptions      No prescriptions requested or ordered in this encounter     No follow-ups on file. No orders of the defined types were placed in this encounter.     No orders of the defined types were placed in this encounter. Patient Instructions   SURVEY:    You may be receiving a survey from [employer] regarding your visit today. Please complete the survey to enable us to provide the highest quality of care to you and your family. If you cannot score us a very good (5 Stars) on any question, please call the office to discuss how we could have made your experience a very good one. Thank you. Clinical Care Team: DOUGIE Rawls Res, LPN    Clerical Team: [address]        Electronically signed by ROSALBA Rawls Res, CNP on 10/4/2021 at 4:03 PM           Completed Refills      Requested Prescriptions      No prescriptions requested or ordered in this encounter         [facility] received counseling on the following healthy behaviors: nutrition, exercise and medication adherence  Reviewed prior labs and health maintenance. Continue current medications, diet and exercise. Discussed use, benefit, and side effects of prescribed medications. Barriers to medication compliance addressed. Patient given educational materials - see patient instructions. All patient questions answered. Patient voiced understanding.

## 2021-10-13 ENCOUNTER — HOSPITAL ENCOUNTER (OUTPATIENT)
Age: 46
Discharge: HOME | End: 2021-10-13
Payer: COMMERCIAL

## 2021-10-13 DIAGNOSIS — Z13.1 ENCOUNTER FOR SCREENING EXAMINATION FOR IMPAIRED GLUCOSE REGULATION AND DIABETES MELLITUS: ICD-10-CM

## 2021-10-13 DIAGNOSIS — Z02.89 ENCOUNTER FOR PHYSICAL EXAMINATION OF PROSPECTIVE FOSTER PARENT: ICD-10-CM

## 2021-10-13 DIAGNOSIS — Z13.220 SCREENING FOR HYPERLIPIDEMIA: ICD-10-CM

## 2021-10-13 LAB
ABSOLUTE EOS #: 0.1 K/UL (ref 0–0.4)
ABSOLUTE IMMATURE GRANULOCYTE: NORMAL K/UL (ref 0–0.3)
ABSOLUTE LYMPH #: 2.1 K/UL (ref 1–4.8)
ABSOLUTE MONO #: 0.3 K/UL (ref 0–1)
ALBUMIN SERPL-MCNC: 4.5 G/DL (ref 3.5–5.2)
ALBUMIN/GLOBULIN RATIO: ABNORMAL (ref 1–2.5)
ALP BLD-CCNC: 66 U/L (ref 40–129)
ALT SERPL-CCNC: 15 U/L (ref 5–41)
ANION GAP SERPL CALCULATED.3IONS-SCNC: 8 MMOL/L (ref 9–17)
AST SERPL-CCNC: 15 U/L
BASOPHILS # BLD: 1 % (ref 0–2)
BASOPHILS ABSOLUTE: 0 K/UL (ref 0–0.2)
BILIRUB SERPL-MCNC: 0.32 MG/DL (ref 0.3–1.2)
BUN BLDV-MCNC: 14 MG/DL (ref 6–20)
BUN/CREAT BLD: 16 (ref 9–20)
CALCIUM SERPL-MCNC: 9.8 MG/DL (ref 8.6–10.4)
CHLORIDE BLD-SCNC: 102 MMOL/L (ref 98–107)
CHOLESTEROL/HDL RATIO: 4.6
CHOLESTEROL: 207 MG/DL
CO2: 30 MMOL/L (ref 20–31)
CREAT SERPL-MCNC: 0.89 MG/DL (ref 0.7–1.2)
DIFFERENTIAL TYPE: YES
EOSINOPHILS RELATIVE PERCENT: 2 % (ref 0–5)
GFR AFRICAN AMERICAN: >60 ML/MIN
GFR NON-AFRICAN AMERICAN: >60 ML/MIN
GFR SERPL CREATININE-BSD FRML MDRD: ABNORMAL ML/MIN/{1.73_M2}
GFR SERPL CREATININE-BSD FRML MDRD: ABNORMAL ML/MIN/{1.73_M2}
GLUCOSE BLD-MCNC: 88 MG/DL (ref 70–99)
HCT VFR BLD CALC: 43.3 % (ref 41–53)
HDLC SERPL-MCNC: 45 MG/DL
HEMOGLOBIN: 14.7 G/DL (ref 13.5–17.5)
IMMATURE GRANULOCYTES: NORMAL %
LDL CHOLESTEROL: 135 MG/DL (ref 0–130)
LYMPHOCYTES # BLD: 39 % (ref 13–44)
MCH RBC QN AUTO: 30.1 PG (ref 26–34)
MCHC RBC AUTO-ENTMCNC: 33.9 G/DL (ref 31–37)
MCV RBC AUTO: 88.8 FL (ref 80–100)
MONOCYTES # BLD: 6 % (ref 5–9)
NRBC AUTOMATED: NORMAL PER 100 WBC
PDW BLD-RTO: 12.9 % (ref 12.1–15.2)
PLATELET # BLD: 252 K/UL (ref 140–450)
PLATELET ESTIMATE: NORMAL
PMV BLD AUTO: NORMAL FL (ref 6–12)
POTASSIUM SERPL-SCNC: 4.3 MMOL/L (ref 3.7–5.3)
RBC # BLD: 4.87 M/UL (ref 4.5–5.9)
RBC # BLD: NORMAL 10*6/UL
SEG NEUTROPHILS: 52 % (ref 39–75)
SEGMENTED NEUTROPHILS ABSOLUTE COUNT: 2.8 K/UL (ref 2.1–6.5)
SODIUM BLD-SCNC: 140 MMOL/L (ref 135–144)
TOTAL PROTEIN: 7.1 G/DL (ref 6.4–8.3)
TRIGL SERPL-MCNC: 133 MG/DL
VLDLC SERPL CALC-MCNC: ABNORMAL MG/DL (ref 1–30)
WBC # BLD: 5.4 K/UL (ref 3.5–11)
WBC # BLD: NORMAL 10*3/UL

## 2021-10-13 PROCEDURE — 36415 COLL VENOUS BLD VENIPUNCTURE: CPT

## 2021-10-13 PROCEDURE — 80061 LIPID PANEL: CPT

## 2021-10-13 PROCEDURE — 80053 COMPREHEN METABOLIC PANEL: CPT

## 2021-10-13 PROCEDURE — 85025 COMPLETE CBC W/AUTO DIFF WBC: CPT

## 2022-01-06 ENCOUNTER — HOSPITAL ENCOUNTER (OUTPATIENT)
Dept: PREADMISSION TESTING | Age: 47
Setting detail: SPECIMEN
Discharge: HOME | End: 2022-01-06
Payer: COMMERCIAL

## 2022-01-06 ENCOUNTER — TELEPHONE (OUTPATIENT)
Dept: FAMILY MEDICINE CLINIC | Age: 47
End: 2022-01-06

## 2022-01-06 ENCOUNTER — TELEPHONE (OUTPATIENT)
Dept: PRIMARY CARE CLINIC | Age: 47
End: 2022-01-06

## 2022-01-06 DIAGNOSIS — Z20.822 SUSPECTED COVID-19 VIRUS INFECTION: Primary | ICD-10-CM

## 2022-01-06 DIAGNOSIS — Z20.822 SUSPECTED COVID-19 VIRUS INFECTION: ICD-10-CM

## 2022-01-06 LAB
SARS-COV-2, RAPID: NOT DETECTED
SPECIMEN DESCRIPTION: NORMAL

## 2022-01-06 PROCEDURE — C9803 HOPD COVID-19 SPEC COLLECT: HCPCS

## 2022-01-06 PROCEDURE — 87635 SARS-COV-2 COVID-19 AMP PRB: CPT

## 2022-01-06 NOTE — TELEPHONE ENCOUNTER
Patient's wife called and said she would like to know if they can all be tested. Wife noted patient is experiencing a sore throat since yesterday. She said her sister tested positive on 12/31/2021 and they were exposed to her. Wife also has SX and encounter sent to Dr. Chiang Course with her information and a request for a rapid covid test.       Rapid Covid test order pending.

## 2023-06-21 ENCOUNTER — OFFICE VISIT (OUTPATIENT)
Dept: FAMILY MEDICINE CLINIC | Age: 48
End: 2023-06-21
Payer: COMMERCIAL

## 2023-06-21 VITALS
WEIGHT: 260 LBS | HEART RATE: 72 BPM | SYSTOLIC BLOOD PRESSURE: 128 MMHG | OXYGEN SATURATION: 97 % | BODY MASS INDEX: 34.46 KG/M2 | DIASTOLIC BLOOD PRESSURE: 84 MMHG | HEIGHT: 73 IN

## 2023-06-21 DIAGNOSIS — J06.9 VIRAL URI: Primary | ICD-10-CM

## 2023-06-21 PROCEDURE — G8427 DOCREV CUR MEDS BY ELIG CLIN: HCPCS | Performed by: FAMILY MEDICINE

## 2023-06-21 PROCEDURE — G8417 CALC BMI ABV UP PARAM F/U: HCPCS | Performed by: FAMILY MEDICINE

## 2023-06-21 PROCEDURE — 99213 OFFICE O/P EST LOW 20 MIN: CPT | Performed by: FAMILY MEDICINE

## 2023-06-21 PROCEDURE — 1036F TOBACCO NON-USER: CPT | Performed by: FAMILY MEDICINE

## 2023-06-21 RX ORDER — GABAPENTIN 300 MG/1
300 CAPSULE ORAL 3 TIMES DAILY
COMMUNITY
Start: 2023-05-14

## 2023-06-21 RX ORDER — CETIRIZINE HYDROCHLORIDE 10 MG/1
10 TABLET ORAL DAILY
Qty: 30 TABLET | Refills: 5 | Status: SHIPPED | OUTPATIENT
Start: 2023-06-21

## 2023-06-21 RX ORDER — FLUTICASONE PROPIONATE 50 MCG
2 SPRAY, SUSPENSION (ML) NASAL DAILY
Qty: 1 EACH | Refills: 0 | Status: SHIPPED | OUTPATIENT
Start: 2023-06-21

## 2023-06-21 NOTE — PROGRESS NOTES
Name: Helena Walter  : 1975         Chief Complaint:     Chief Complaint   Patient presents with    Cough     Sore throat, cough, congestion, hoarse. Started a month ago but worse the past 2 days. No fever. History of Present Illness:      Helena Walter is a 56 y.o.  male who presents with Cough (Sore throat, cough, congestion, hoarse. Started a month ago but worse the past 2 days. No fever. )      HPI    Pt c/o sore throat, cough, nasal congestion, hoarse voice. No fever, chills, GI symptoms. Has been sick intermittently for a few wks along with other family members but is a lot worse the past couple days. Had had red and draining eye at one point tx with atb gtt and things did improve. Medical History:     Patient Active Problem List   Diagnosis    Depression    Anxiety    Panic attacks    Hyperlipidemia    Gastroesophageal reflux disease without esophagitis    Kidney donor    Moderate episode of recurrent major depressive disorder (HCC)       Medications:       Prior to Admission medications    Medication Sig Start Date End Date Taking? Authorizing Provider   fluticasone (FLONASE) 50 MCG/ACT nasal spray 2 sprays by Each Nostril route daily 23  Yes Elie Clayton DO   cetirizine (ZYRTEC) 10 MG tablet Take 1 tablet by mouth daily 23  Yes Elie Clayton DO   amoxicillin-clavulanate (AUGMENTIN) 875-125 MG per tablet Take 1 tablet by mouth 2 times daily for 10 days 6/23/23 7/3/23  Elie Clayton DO   gabapentin (NEURONTIN) 300 MG capsule Take 1 capsule by mouth 3 times daily. 23   Historical Provider, MD   DULoxetine (CYMBALTA) 60 MG extended release capsule Take 1 capsule by mouth every morning 21   Historical Provider, MD   hydrOXYzine (ATARAX) 25 MG tablet Take 2 tablets daily 21   Anna Pinto MD        Allergies:       Patient has no known allergies.     Physical Exam:     Vitals:  /84   Pulse 72   Ht 6' 1\" (1.854 m)   Wt 260 lb (117.9 kg)

## 2023-06-23 ENCOUNTER — TELEPHONE (OUTPATIENT)
Dept: FAMILY MEDICINE CLINIC | Age: 48
End: 2023-06-23

## 2023-06-23 RX ORDER — AMOXICILLIN AND CLAVULANATE POTASSIUM 875; 125 MG/1; MG/1
1 TABLET, FILM COATED ORAL 2 TIMES DAILY
Qty: 20 TABLET | Refills: 0 | Status: SHIPPED | OUTPATIENT
Start: 2023-06-23 | End: 2023-07-03

## 2023-09-22 ENCOUNTER — HOSPITAL ENCOUNTER (EMERGENCY)
Age: 48
Discharge: HOME | End: 2023-09-22
Attending: EMERGENCY MEDICINE
Payer: COMMERCIAL

## 2023-09-22 VITALS
RESPIRATION RATE: 16 BRPM | OXYGEN SATURATION: 89 % | SYSTOLIC BLOOD PRESSURE: 148 MMHG | HEIGHT: 73 IN | TEMPERATURE: 98.8 F | HEART RATE: 106 BPM | BODY MASS INDEX: 34.06 KG/M2 | DIASTOLIC BLOOD PRESSURE: 103 MMHG | WEIGHT: 257 LBS

## 2023-09-22 DIAGNOSIS — J34.89 NASAL VESTIBULITIS: Primary | ICD-10-CM

## 2023-09-22 PROCEDURE — 99283 EMERGENCY DEPT VISIT LOW MDM: CPT

## 2023-09-22 RX ORDER — DOXYCYCLINE HYCLATE 100 MG
100 TABLET ORAL 2 TIMES DAILY
Qty: 14 TABLET | Refills: 0 | Status: SHIPPED | OUTPATIENT
Start: 2023-09-22 | End: 2023-09-29

## 2023-09-22 RX ORDER — ALPRAZOLAM 0.5 MG/1
0.5 TABLET ORAL NIGHTLY PRN
COMMUNITY

## 2023-09-22 ASSESSMENT — PAIN DESCRIPTION - FREQUENCY: FREQUENCY: CONTINUOUS

## 2023-09-22 ASSESSMENT — PAIN DESCRIPTION - ORIENTATION: ORIENTATION: LEFT

## 2023-09-22 ASSESSMENT — PAIN DESCRIPTION - LOCATION: LOCATION: FACE

## 2023-09-22 ASSESSMENT — PAIN DESCRIPTION - DESCRIPTORS: DESCRIPTORS: THROBBING

## 2023-09-22 ASSESSMENT — LIFESTYLE VARIABLES
HOW OFTEN DO YOU HAVE A DRINK CONTAINING ALCOHOL: NEVER
HOW MANY STANDARD DRINKS CONTAINING ALCOHOL DO YOU HAVE ON A TYPICAL DAY: PATIENT DOES NOT DRINK

## 2023-09-22 ASSESSMENT — PAIN - FUNCTIONAL ASSESSMENT: PAIN_FUNCTIONAL_ASSESSMENT: 0-10

## 2023-09-22 ASSESSMENT — PAIN DESCRIPTION - PAIN TYPE: TYPE: ACUTE PAIN

## 2023-09-22 ASSESSMENT — PAIN SCALES - GENERAL: PAINLEVEL_OUTOF10: 7

## 2023-09-22 NOTE — DISCHARGE INSTRUCTIONS
Use Tylenol or Motrin for pain. Use warm compresses to the area. Take doxycycline twice a day. Use Bactroban ointment inside the nose and on the top of the nose 3 times a day as prescribed. Follow-up with your primary care doctor this week.

## 2023-09-22 NOTE — ED PROVIDER NOTES
nostril pain with redness to the nose. Likely has nasal vestibulitis. Counseled in regards to care treatment and evaluation. 1)  Number and Complexity of Problems  Problem List This Visit:   Nasal pain  Problem List Items Addressed This Visit    None  Visit Diagnoses       Nasal vestibulitis    -  Primary            Differential Diagnosis: Nasal vestibulitis, cellulitis, shingles, stroke mass bleed      2)  Data Reviewed    Imaging that is independently reviewed with the associated radiologist report, not interpreted by me are:  Not applicable    Imaging that is independently reviewed and interpreted by me as:  Not applicable        See more data below for the lab and radiology tests and orders. 3)  Treatment and Disposition    Shared Decision Making:  Not applicable      FINAL  REASSESSMENT     6:50 PM       Counseled patient regards to care and treatment at this time. Looks infectious like a nasal vestibulitis. Nothing else to suggest intracranial process although he had some paresthesias according to the nursing staff although has a nonfocal neuro    PROCEDURES:   none    Procedures     FINAL IMPRESSION      1. Nasal vestibulitis          DISPOSITION/PLAN     DISPOSITION Decision To Discharge 09/22/2023 06:08:29 PM      PATIENT REFERRED TO:  Aretha Valencia MD  [id number] [phone]    Call   Follow up from ER condition      DISCHARGE MEDICATIONS:  Discharge Medication List as of 9/22/2023  6:09 PM        START taking these medications    Details   mupirocin (BACTROBAN) 2 % ointment Apply topically 3 times daily. , Disp-1 each, R-0, Normal      doxycycline hyclate (VIBRA-TABS) 100 MG tablet Take 1 tablet by mouth 2 times daily for 7 days, Disp-14 tablet, R-0Normal             (Please note that portions of this note were completed with a voice recognition program.  Efforts were made to edit the dictations but occasionally words are mis-transcribed.)    Ginny Del Castillo MD

## 2023-09-25 ENCOUNTER — OFFICE VISIT (OUTPATIENT)
Dept: FAMILY MEDICINE CLINIC | Age: 48
End: 2023-09-25
Payer: COMMERCIAL

## 2023-09-25 VITALS
WEIGHT: 257 LBS | OXYGEN SATURATION: 97 % | HEART RATE: 100 BPM | SYSTOLIC BLOOD PRESSURE: 128 MMHG | TEMPERATURE: 98.9 F | DIASTOLIC BLOOD PRESSURE: 84 MMHG | BODY MASS INDEX: 33.91 KG/M2

## 2023-09-25 DIAGNOSIS — R51.9 FACIAL PAIN: ICD-10-CM

## 2023-09-25 DIAGNOSIS — J34.0 CELLULITIS OF EXTERNAL NOSE: Primary | ICD-10-CM

## 2023-09-25 PROCEDURE — G8417 CALC BMI ABV UP PARAM F/U: HCPCS | Performed by: FAMILY MEDICINE

## 2023-09-25 PROCEDURE — 99213 OFFICE O/P EST LOW 20 MIN: CPT | Performed by: FAMILY MEDICINE

## 2023-09-25 PROCEDURE — G8427 DOCREV CUR MEDS BY ELIG CLIN: HCPCS | Performed by: FAMILY MEDICINE

## 2023-09-25 PROCEDURE — 1036F TOBACCO NON-USER: CPT | Performed by: FAMILY MEDICINE

## 2023-09-25 RX ORDER — DULOXETIN HYDROCHLORIDE 30 MG/1
CAPSULE, DELAYED RELEASE ORAL
COMMUNITY
Start: 2023-09-22

## 2023-09-25 RX ORDER — SULFAMETHOXAZOLE AND TRIMETHOPRIM 800; 160 MG/1; MG/1
1 TABLET ORAL 2 TIMES DAILY
Qty: 20 TABLET | Refills: 0 | Status: SHIPPED | OUTPATIENT
Start: 2023-09-25 | End: 2023-10-05

## 2023-09-25 RX ORDER — TRAMADOL HYDROCHLORIDE 50 MG/1
50 TABLET ORAL EVERY 6 HOURS PRN
Qty: 12 TABLET | Refills: 0 | Status: SHIPPED | OUTPATIENT
Start: 2023-09-25 | End: 2023-09-28

## 2023-09-25 ASSESSMENT — ENCOUNTER SYMPTOMS: VOMITING: 0

## 2023-09-25 NOTE — PATIENT INSTRUCTIONS
SURVEY:    You may be receiving a survey from [employer] regarding your visit today. Please complete the survey to enable us to provide the highest quality of care to you and your family. If you cannot score us a very good on any question, please call the office to discuss how we could have made your experience a very good one. Thank you.

## 2023-09-25 NOTE — PROGRESS NOTES
HPI Notes    Name: Elliot Donald  : 1975        Chief Complaint:     Chief Complaint   Patient presents with    Facial Pain     Patient here today for ED follow up. Was at [facility] on 23 and dx with nasal vestibulitis and was given bactroban cream and doxycyline. His nose his red, swolllen, painful. It is worse since the ED visit. History of Present Illness:     Elliot Donald is a 56 y.o.  male who presents with Facial Pain (Patient here today for ED follow up. Was at [facility] on 23 and dx with nasal vestibulitis and was given bactroban cream and doxycyline. His nose his red, swolllen, painful. It is worse since the ED visit. )      HPI  Nasal swelling and redness - pt states he used a tweezer to pluck nasal hairs on the Lt then 1d later nose got red and swollen. Pt was in ER 3d ago and started on doxycycline and not any better in fact more red and painful. No eye pain or difficulty moving eyes. No F/C/V. Pt is taking his meds and again not better. Pt is taking tylenol for pain as can't take NSAIDS    Past Medical History:     Past Medical History:   Diagnosis Date    Dysthymic disorder     Insomnia       Reviewed all health maintenance requirements and ordered appropriate tests  Health Maintenance Due   Topic Date Due    Hepatitis B vaccine (1 of 3 - 3-dose series) Never done    HIV screen  Never done    Hepatitis C screen  Never done    COVID-19 Vaccine (3 - Moderna series) 2021    Colorectal Cancer Screen  Never done    Flu vaccine (1) Never done       Past Surgical History:     Past Surgical History:   Procedure Laterality Date    KIDNEY REMOVAL  2017    left kidney donated to a friend        Medications:       Prior to Admission medications    Medication Sig Start Date End Date Taking?  Authorizing Provider   DULoxetine (CYMBALTA) 30 MG extended release capsule TAKE 1 CAPSULE BY MOUTH EVERY MORNING with the 60 mg OF cymbalta for a total OF 90 mg 23  Yes Historical

## 2023-09-27 ENCOUNTER — OFFICE VISIT (OUTPATIENT)
Dept: FAMILY MEDICINE CLINIC | Age: 48
End: 2023-09-27
Payer: COMMERCIAL

## 2023-09-27 VITALS
HEIGHT: 73 IN | SYSTOLIC BLOOD PRESSURE: 118 MMHG | HEART RATE: 70 BPM | WEIGHT: 257 LBS | DIASTOLIC BLOOD PRESSURE: 76 MMHG | BODY MASS INDEX: 34.06 KG/M2 | OXYGEN SATURATION: 98 %

## 2023-09-27 DIAGNOSIS — J34.0 CELLULITIS OF EXTERNAL NOSE: Primary | ICD-10-CM

## 2023-09-27 PROCEDURE — 99213 OFFICE O/P EST LOW 20 MIN: CPT | Performed by: FAMILY MEDICINE

## 2023-09-27 PROCEDURE — G8417 CALC BMI ABV UP PARAM F/U: HCPCS | Performed by: FAMILY MEDICINE

## 2023-09-27 PROCEDURE — G8427 DOCREV CUR MEDS BY ELIG CLIN: HCPCS | Performed by: FAMILY MEDICINE

## 2023-09-27 PROCEDURE — 1036F TOBACCO NON-USER: CPT | Performed by: FAMILY MEDICINE

## 2023-09-27 ASSESSMENT — ENCOUNTER SYMPTOMS
FACIAL SWELLING: 0
VOMITING: 0
EYE DISCHARGE: 0
NAUSEA: 0
EYE REDNESS: 0
RHINORRHEA: 0

## 2023-09-27 NOTE — PROGRESS NOTES
HPI Notes    Name: Mary Bishop  : 1975        Chief Complaint:     Chief Complaint   Patient presents with    Cellulitis     F/u facial cellulitis.  stopped doxycycline, started on Bactrim. Redness, swelling, pain improving. History of Present Illness:     Mary Bishop is a 56 y.o.  male who presents with Cellulitis (F/u facial cellulitis.  stopped doxycycline, started on Bactrim. Redness, swelling, pain improving. )      HPI  Cellulitis -  pt started bactrim 2d ago and stopped the doxycycline and here today for f/u. Yesterday was still red and sore but finally last night started getting better. Pt states pain is gone today and no headaches. Overall pt is 90% better this AM and no more redness. No F/C    Past Medical History:     Past Medical History:   Diagnosis Date    Dysthymic disorder     Insomnia       Reviewed all health maintenance requirements and ordered appropriate tests  Health Maintenance Due   Topic Date Due    Hepatitis B vaccine (1 of 3 - 3-dose series) Never done    HIV screen  Never done    Hepatitis C screen  Never done    COVID-19 Vaccine (3 - Kathya Medley series) 2021    Colorectal Cancer Screen  Never done    Flu vaccine (1) Never done       Past Surgical History:     Past Surgical History:   Procedure Laterality Date    KIDNEY REMOVAL  2017    left kidney donated to a friend        Medications:       Prior to Admission medications    Medication Sig Start Date End Date Taking? Authorizing Provider   DULoxetine (CYMBALTA) 30 MG extended release capsule TAKE 1 CAPSULE BY MOUTH EVERY MORNING with the 60 mg OF cymbalta for a total OF 90 mg 23  Yes Historical Provider, MD   sulfamethoxazole-trimethoprim (BACTRIM DS;SEPTRA DS) 800-160 MG per tablet Take 1 tablet by mouth 2 times daily for 10 days 9/25/23 10/5/23 Yes Mio Garza MD   gabapentin (NEURONTIN) 300 MG capsule Take 1 capsule by mouth 3 times daily.  23  Yes Historical Provider, MD

## 2023-09-27 NOTE — PATIENT INSTRUCTIONS
Survey: You may be receiving a survey from [employer] regarding your visit today. You may get this in the mail, through your MyChart or in your email. Please complete the survey to enable us to provide the highest quality of care to you and your family. Please also, mention our names. If you cannot score us as very good (5 Stars) on any question, please feel free to call the office to discuss how we could have made your experience exceptional.      Thank You!         MD Sherri Gore LPN

## 2023-10-16 ENCOUNTER — OFFICE VISIT (OUTPATIENT)
Dept: FAMILY MEDICINE CLINIC | Age: 48
End: 2023-10-16
Payer: COMMERCIAL

## 2023-10-16 VITALS
WEIGHT: 252 LBS | DIASTOLIC BLOOD PRESSURE: 80 MMHG | HEIGHT: 73 IN | HEART RATE: 72 BPM | BODY MASS INDEX: 33.4 KG/M2 | SYSTOLIC BLOOD PRESSURE: 110 MMHG | OXYGEN SATURATION: 95 %

## 2023-10-16 DIAGNOSIS — Z00.00 ANNUAL PHYSICAL EXAM: ICD-10-CM

## 2023-10-16 DIAGNOSIS — F41.9 ANXIETY AND DEPRESSION: ICD-10-CM

## 2023-10-16 DIAGNOSIS — F32.A ANXIETY AND DEPRESSION: ICD-10-CM

## 2023-10-16 DIAGNOSIS — Z12.11 COLON CANCER SCREENING: Primary | ICD-10-CM

## 2023-10-16 DIAGNOSIS — S29.019A THORACIC MYOFASCIAL STRAIN, INITIAL ENCOUNTER: ICD-10-CM

## 2023-10-16 PROCEDURE — 99396 PREV VISIT EST AGE 40-64: CPT | Performed by: FAMILY MEDICINE

## 2023-10-16 PROCEDURE — G8484 FLU IMMUNIZE NO ADMIN: HCPCS | Performed by: FAMILY MEDICINE

## 2023-10-16 ASSESSMENT — ENCOUNTER SYMPTOMS
ABDOMINAL PAIN: 0
BLOOD IN STOOL: 0
DIARRHEA: 0
VOMITING: 0
NAUSEA: 0
FACIAL SWELLING: 0
BACK PAIN: 1
EYE DISCHARGE: 0
COUGH: 0
SHORTNESS OF BREATH: 0
SORE THROAT: 0
EYE REDNESS: 0
TROUBLE SWALLOWING: 0

## 2023-10-16 NOTE — PROGRESS NOTES
HPI Notes    Name: Rajwinder Badillo  : 1975        Chief Complaint:     Chief Complaint   Patient presents with    Annual Exam     Medical statement for foster care / adoption. Mental Health Problem     Anxiety. Follows with psych. History of Present Illness:     Rajwinder Badillo is a 56 y.o.  male who presents with Annual Exam (Medical statement for foster care / adoption. ) and Mental Health Problem (Anxiety. Follows with psych. )    Annual exam - Pt is doing well. 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 No new concerns. Mental Health Problem  The primary symptoms do not include dysphoric mood, delusions, hallucinations or disorganized speech. Primary symptoms comment: doing well and stable on cymbalta. The current episode started more than 1 month ago. This is a chronic problem. The degree of incapacity that he is experiencing as a consequence of his illness is mild. Additional symptoms of the illness do not include appetite change, fatigue, headaches or abdominal pain. Mid back pain - no injury but mid back pain more since started back working at his current job. Pt stands all the time at work and then leans forward to work. Pt knows he also has some mild scoliosis. No numbness/tingling  Pt does heat and stretches, icy hot patches.  Pt takes some tylenol or alleve/motrin  No rash   Past Medical History:     Past Medical History:   Diagnosis Date    Dysthymic disorder     Insomnia       Reviewed all health maintenance requirements and ordered appropriate tests  Health Maintenance Due   Topic Date Due    Hepatitis B vaccine (1 of 3 - 3-dose series) Never done    HIV screen  Never done    Hepatitis C screen  Never done    COVID-19 Vaccine (3 - Gearldine Parkers series) 2021    Colorectal Cancer Screen  Never done    Flu vaccine (1) Never done       Past Surgical History:     Past Surgical History:   Procedure Laterality Date    KIDNEY REMOVAL  2017    left kidney donated to a

## 2024-02-20 RX ORDER — CETIRIZINE HYDROCHLORIDE 10 MG/1
10 TABLET ORAL DAILY
Qty: 30 TABLET | Refills: 5 | Status: SHIPPED | OUTPATIENT
Start: 2024-02-20

## 2024-02-20 NOTE — TELEPHONE ENCOUNTER
Last OV: 10/16/2023  physical   Last RX:    Next scheduled apt: Visit date not found            [facility] requesting a refill

## 2024-03-19 ENCOUNTER — HOSPITAL ENCOUNTER (OUTPATIENT)
Age: 49
Discharge: HOME | End: 2024-03-19
Payer: COMMERCIAL

## 2024-03-19 LAB
ALBUMIN SERPL-MCNC: 4.1 G/DL (ref 3.5–5.2)
ALP SERPL-CCNC: 83 U/L (ref 40–129)
ALT SERPL-CCNC: 19 U/L (ref 5–41)
ANION GAP SERPL CALCULATED.3IONS-SCNC: 9 MMOL/L (ref 9–17)
AST SERPL-CCNC: 19 U/L
BASOPHILS # BLD: 0.04 K/UL (ref 0–0.2)
BASOPHILS NFR BLD: 1 % (ref 0–2)
BILIRUB SERPL-MCNC: 0.5 MG/DL (ref 0.3–1.2)
BUN SERPL-MCNC: 15 MG/DL (ref 6–20)
BUN/CREAT SERPL: 14 (ref 9–20)
CALCIUM SERPL-MCNC: 9.5 MG/DL (ref 8.6–10.4)
CHLORIDE SERPL-SCNC: 102 MMOL/L (ref 98–107)
CO2 SERPL-SCNC: 27 MMOL/L (ref 20–31)
CREAT SERPL-MCNC: 1.1 MG/DL (ref 0.7–1.2)
EOSINOPHIL # BLD: 0.19 K/UL (ref 0–0.4)
EOSINOPHILS RELATIVE PERCENT: 3 % (ref 0–5)
ERYTHROCYTE [DISTWIDTH] IN BLOOD BY AUTOMATED COUNT: 13.1 % (ref 12.1–15.2)
GFR SERPL CREATININE-BSD FRML MDRD: >60 ML/MIN/1.73M2
GLUCOSE SERPL-MCNC: 90 MG/DL (ref 70–99)
HCT VFR BLD AUTO: 46 % (ref 41–53)
HGB BLD-MCNC: 15.9 G/DL (ref 13.5–17.5)
IMM GRANULOCYTES # BLD AUTO: 0.01 K/UL (ref 0–0.3)
IMM GRANULOCYTES NFR BLD: 0 % (ref 0–5)
LYMPHOCYTES NFR BLD: 2.44 K/UL (ref 1–4.8)
LYMPHOCYTES RELATIVE PERCENT: 35 % (ref 13–44)
MCH RBC QN AUTO: 30.1 PG (ref 26–34)
MCHC RBC AUTO-ENTMCNC: 34.6 G/DL (ref 31–37)
MCV RBC AUTO: 87 FL (ref 80–100)
MONOCYTES NFR BLD: 0.47 K/UL (ref 0–1)
MONOCYTES NFR BLD: 7 % (ref 5–9)
NEUTROPHILS NFR BLD: 54 % (ref 39–75)
NEUTS SEG NFR BLD: 3.82 K/UL (ref 2.1–6.5)
PLATELET # BLD AUTO: 237 K/UL (ref 140–450)
PMV BLD AUTO: 9.8 FL (ref 6–12)
POTASSIUM SERPL-SCNC: 4.2 MMOL/L (ref 3.7–5.3)
PROT SERPL-MCNC: 6.8 G/DL (ref 6.4–8.3)
RBC # BLD AUTO: 5.29 M/UL (ref 4.5–5.9)
SODIUM SERPL-SCNC: 138 MMOL/L (ref 135–144)
WBC OTHER # BLD: 7 K/UL (ref 3.5–11)

## 2024-03-19 PROCEDURE — 80053 COMPREHEN METABOLIC PANEL: CPT

## 2024-03-19 PROCEDURE — 85025 COMPLETE CBC W/AUTO DIFF WBC: CPT

## 2024-03-19 PROCEDURE — 36415 COLL VENOUS BLD VENIPUNCTURE: CPT

## 2024-08-22 RX ORDER — CETIRIZINE HYDROCHLORIDE 10 MG/1
10 TABLET ORAL DAILY
Qty: 30 TABLET | Refills: 5 | Status: SHIPPED | OUTPATIENT
Start: 2024-08-22

## 2024-08-22 NOTE — TELEPHONE ENCOUNTER
Last OV 10/16/23     Next OV not scheduled    Requesting refill on cetirizine thru [facility].  Rx pending